# Patient Record
Sex: MALE | Race: WHITE | NOT HISPANIC OR LATINO | Employment: FULL TIME | URBAN - METROPOLITAN AREA
[De-identification: names, ages, dates, MRNs, and addresses within clinical notes are randomized per-mention and may not be internally consistent; named-entity substitution may affect disease eponyms.]

---

## 2018-12-12 ENCOUNTER — PRE VISIT (OUTPATIENT)
Dept: UROLOGY | Facility: CLINIC | Age: 33
End: 2018-12-12

## 2018-12-12 NOTE — TELEPHONE ENCOUNTER
MEDICAL RECORDS REQUEST   [facility]  Urology Clinic  [address] [place] [address]  PHONE: [phone]  Fax: [phone]        FUTURE VISIT INFORMATION                                                   Freddie Rodriguez, : 1985 scheduled for future visit at [facility]    APPOINTMENT INFORMATION:    Date: 2019    Provider:  LAMONT ROACH    Reason for Visit/Diagnosis: INFERTILITY    REFERRAL INFORMATION:    Referring provider:  SELF    Specialty: SELF    Referring providers clinic:  SELF    Clinic contact number:  SELF    RECORDS REQUESTED FOR VISIT                                                     NOTES  STATUS/DETAILS   OFFICE NOTE from referring provider  no   OFFICE NOTE from other specialist  no   DISCHARGE SUMMARY from hospital  no   DISCHARGE REPORT from the ER  no   OPERATIVE REPORT  no   MEDICATION LIST  no   INFERTILITY     ALBUMIN  in process   FSH  in process   LAST UROLOGY/OB GYN VISIT NOTE  in process   LH  in process   SEMEN ANALYSIS (LAST 2)  yes   SHBG  in process   T  in process       PRE-VISIT CHECKLIST      Record collection complete Yes     Appointment appropriately scheduled           (right time/right provider) Yes   MyChart activation If no, please explain IN PROCESS   Questionnaire complete If no, please explain IN PROCESS     Completed by: Charo Fitzgerald

## 2019-01-15 ENCOUNTER — TRANSFERRED RECORDS (OUTPATIENT)
Dept: HEALTH INFORMATION MANAGEMENT | Facility: CLINIC | Age: 34
End: 2019-01-15

## 2019-01-23 DIAGNOSIS — Z31.41 FERTILITY TESTING: Primary | ICD-10-CM

## 2019-01-30 ENCOUNTER — PRE VISIT (OUTPATIENT)
Dept: UROLOGY | Facility: CLINIC | Age: 34
End: 2019-01-30

## 2019-01-30 NOTE — TELEPHONE ENCOUNTER
Patient coming in for fertility consult with Dr. Monson. SA not in system. Left message for patient to call IVETTE to schedule SA.

## 2019-02-07 ENCOUNTER — OFFICE VISIT (OUTPATIENT)
Dept: UROLOGY | Facility: CLINIC | Age: 34
End: 2019-02-07
Payer: COMMERCIAL

## 2019-02-07 ENCOUNTER — APPOINTMENT (OUTPATIENT)
Dept: LAB | Facility: CLINIC | Age: 34
End: 2019-02-07
Payer: COMMERCIAL

## 2019-02-07 VITALS
DIASTOLIC BLOOD PRESSURE: 81 MMHG | HEART RATE: 58 BPM | HEIGHT: 72 IN | WEIGHT: 165 LBS | BODY MASS INDEX: 22.35 KG/M2 | SYSTOLIC BLOOD PRESSURE: 128 MMHG

## 2019-02-07 DIAGNOSIS — R86.8 TERATOSPERMIA: Primary | ICD-10-CM

## 2019-02-07 LAB — FSH SERPL-ACNC: 6.2 IU/L (ref 0.7–10.8)

## 2019-02-07 RX ORDER — ONDANSETRON 4 MG/1
TABLET, ORALLY DISINTEGRATING ORAL
Refills: 0 | COMMUNITY
Start: 2018-10-28

## 2019-02-07 ASSESSMENT — ENCOUNTER SYMPTOMS
RECTAL PAIN: 0
HEARTBURN: 1
CONSTIPATION: 0
VOMITING: 0
ABDOMINAL PAIN: 0
BLOOD IN STOOL: 0
BLOATING: 0
NAUSEA: 0
DIARRHEA: 0
BOWEL INCONTINENCE: 0
JAUNDICE: 0

## 2019-02-07 ASSESSMENT — PAIN SCALES - GENERAL: PAINLEVEL: NO PAIN (0)

## 2019-02-07 ASSESSMENT — MIFFLIN-ST. JEOR: SCORE: 1731.44

## 2019-02-07 NOTE — PROGRESS NOTES
It was my pleasure to see  Freddieshayy Hoyehuda, a 33 year old male here in consultation today for fertility evaluation.      HPI  Freddie Rodriguez is a 33 year old male with no significant PMH.  He and his partner have been attempting to conceive for the last 12 months. He has had one prior pregnancy with his current wife. They have tried timed intercours, including LH strips. They have not tried IUI or IVF.  They do not use lubrication during intercourse.  No associated conditions such as ED or sexual dysfunction.     The patient's spouse, Candace, is 34 years old.  She is in good health.  She has been pregnant once before.  She has regular monthly menstrual cycles.  She has been evaluated for infertility and has low progesterone, for which she is getting progesterone vaginal suppositories. She is scheduled to get an HSG shortly.    He did have a SA in 11/2018 which showed severe oligospermia. However this was a few weeks after he had pneumonia with high fever. Repeat SA in 1/2019 after he recovered from his illness had good counts.    PAST MEDICAL HISTORY:    Puberty normal   No associated conditions such as ED or sexual dysfunction.  No  problems.     PAST SURG HISTORY  No PSH    Medications as of 2/7/2019:  No meds    ALLERGY:  NKDA    SOCIAL HISTORY:  . 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  No alcohol abuse, no tobacco use.     FAMILY HISTORY: No inherited disorders. Origin from [place].     REVIEW OF SYSTEMS:  Denies erectile dysfunction, ejaculatory problems, testicular pain. No vision or smell deficits, no chronic sinus or respiratory infections. No recent febrile illness, weight loss. No heat or cold intolerance, gynecomastia, or other endocrine complaints.    Otherwise, no constitutional, eye, ENT, heart, lung, GI , musculoskeletal, skin, neurologic, psychiatric, or hematologic complaints.    GONADOTOXIN EXPOSURE: Unremarkable. Otherwise negative for marijuana, heat, chemicals, pesticides, heavy  metals, steroids, chemotherapy or radiation.    GENERAL PHYSICAL EXAM  /81   Pulse 58   Ht 1.829 m (6')   Wt 74.8 kg (165 lb)   BMI 22.38 kg/m     Constitutional: No acute distress. Well nourished.   PSYCH: normal mood and affect.  NEURO: normal gait, no focal deficits.   CARDIOPULMONARY: breathing non-labored, pulse regularrate, no peripheral edema.  GI: Abdomen soft, non-tender, no surgical scars, no organomegaly.  MUSCULOSKELETAL: normal limb proportions, no muscle wasting, no contractures.  SKIN: Normal virilized hair distribution, no lesions, warts or rashes over genitalia, abdomen extremities or face.  HEME/LYMPH: no ecchymosis, no lymphadenopathy in groin or neck, no lymphedema.     EXAM:  Phallus Circumcised, meatus adequate, no plaques palpated.   Left testis descended , size is 20 cc , consistency is nml. No intra-testicular masses.   Right testis descended , size is 20 cc , consistency is nml. No intra-testicular masses.   Epididymes present, non-tender, not enlarged.   Left cord: Vas present. no varicocele noted.  Right cord: Vas present. no varicocele noted.     Rectal exam deferred.     LABS:  Semen Analysis 11/2018:  (normal range in parenthesis)   -Volume: 5.1 ml (1.5-5.0)   -pH: 7.6 (>7.2)   -Concentration: 2.3 million/ml (>15 million/ml)   -% Forward progressive: 82% (>30%)   -Total progressive motile count: 4.2 million (>15.6 million)   -% Normal morphology: 0% (>4%)    Semen Analysis 01/2019:  (normal range in parenthesis)   -Volume: 4.6 ml (1.5-5.0)   -pH: 7.2 (>7.2)   -Concentration: 69.5 million/ml (>15 million/ml)   -% Forward progressive: 94% (>30%)   -Total progressive motile count: 189 million (>15.6 million)   -% Normal morphology: 2% (>4%)    ASSESSMENT:    Isolated SA with severe oligospermia shortly after a febrile illness. Repeat SA two months later is normal.      PLAN:    Hormonal panel including testosterone, estradiol, and FSH    Repeat SA  With CAP-score  testing.    Discussed OTC supplements to consider taking    Pt would be a good candidate for IUI with a female fertility specialist.    Dr. Monson will contact patient with results and plan/options    Patient was seen and examined with Dr. Ermias Tinajero MD  Urology Resident      I saw and examined the patient with the resident today.  I agree with the resident note and plan of care as above.     Neal Monson MD  Urology Staff

## 2019-02-07 NOTE — NURSING NOTE
Chief Complaint   Patient presents with     Consult      fertility consult        Manuela Garcia MA

## 2019-02-07 NOTE — LETTER
2/7/2019       RE: Freddie Rodriguez  [address] [place] [address]     Dear Colleague,    Thank you for referring your patient, Freddie Rodriguez, to the [facility] UROLOGY AND [facility]ST FOR PROST[facility] AND UROLOGIC CANCERS at [facility]. Please see a copy of my visit note below.    It was my pleasure to see Dr. Freddie Rodriguez, a 33 year old male here in consultation today for fertility evaluation.      HPI  Freddie Rodriguez is a 33 year old male with no significant PMH.  He and his partner have been attempting to conceive for the last 12 months. He has had one prior pregnancy with his current wife. They have tried timed intercours, including LH strips. They have not tried IUI or IVF.  They do not use lubrication during intercourse.  No associated conditions such as ED or sexual dysfunction.     The patient's spouse, Candace, is 34 years old.  She is in good health.  She has been pregnant once before.  She has regular monthly menstrual cycles.  She has been evaluated for infertility and has low progesterone, for which she is getting progesterone vaginal suppositories. She is scheduled to get an HSG shortly.    He did have a SA in 11/2018 which showed severe oligospermia. However this was a few weeks after he had pneumonia with high fever. Repeat SA in 1/2019 after he recovered from his illness had good counts.    PAST MEDICAL HISTORY:    Puberty normal   No associated conditions such as ED or sexual dysfunction.  No  problems.     PAST SURG HISTORY  No PSH    Medications as of 2/7/2019:  No meds    ALLERGY:  NKDA    SOCIAL HISTORY:  . 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  No alcohol abuse, no tobacco use.     FAMILY HISTORY: No inherited disorders. Origin from [place].     REVIEW OF SYSTEMS:  Denies erectile dysfunction, ejaculatory problems, testicular pain. No vision or smell deficits, no chronic sinus or respiratory infections. No recent febrile illness, weight loss. No heat or  cold intolerance, gynecomastia, or other endocrine complaints.    Otherwise, no constitutional, eye, ENT, heart, lung, GI , musculoskeletal, skin, neurologic, psychiatric, or hematologic complaints.    GONADOTOXIN EXPOSURE: Unremarkable. Otherwise negative for marijuana, heat, chemicals, pesticides, heavy metals, steroids, chemotherapy or radiation.    GENERAL PHYSICAL EXAM  /81   Pulse 58   Ht 1.829 m (6')   Wt 74.8 kg (165 lb)   BMI 22.38 kg/m      Constitutional: No acute distress. Well nourished.   PSYCH: normal mood and affect.  NEURO: normal gait, no focal deficits.   CARDIOPULMONARY: breathing non-labored, pulse regularrate, no peripheral edema.  GI: Abdomen soft, non-tender, no surgical scars, no organomegaly.  MUSCULOSKELETAL: normal limb proportions, no muscle wasting, no contractures.  SKIN: Normal virilized hair distribution, no lesions, warts or rashes over genitalia, abdomen extremities or face.  HEME/LYMPH: no ecchymosis, no lymphadenopathy in groin or neck, no lymphedema.     EXAM:  Phallus Circumcised, meatus adequate, no plaques palpated.   Left testis descended , size is 20 cc , consistency is nml. No intra-testicular masses.   Right testis descended , size is 20 cc , consistency is nml. No intra-testicular masses.   Epididymes present, non-tender, not enlarged.   Left cord: Vas present. no varicocele noted.  Right cord: Vas present. no varicocele noted.     Rectal exam deferred.     LABS:  Semen Analysis 11/2018:  (normal range in parenthesis)   -Volume: 5.1 ml (1.5-5.0)   -pH: 7.6 (>7.2)   -Concentration: 2.3 million/ml (>15 million/ml)   -% Forward progressive: 82% (>30%)   -Total progressive motile count: 4.2 million (>15.6 million)   -% Normal morphology: 0% (>4%)    Semen Analysis 01/2019:  (normal range in parenthesis)   -Volume: 4.6 ml (1.5-5.0)   -pH: 7.2 (>7.2)   -Concentration: 69.5 million/ml (>15 million/ml)   -% Forward progressive: 94% (>30%)   -Total progressive motile  count: 189 million (>15.6 million)   -% Normal morphology: 2% (>4%)    ASSESSMENT:    Isolated SA with severe oligospermia shortly after a febrile illness. Repeat SA two months later is normal.      PLAN:    Hormonal panel including testosterone, estradiol, and FSH    Repeat SA  With CAP-score testing.    Discussed OTC supplements to consider taking    Pt would be a good candidate for IUI with a female fertility specialist.    Dr. Monson will contact patient with results and plan/options    Patient was seen and examined with Dr. Ermias Tinajero MD  Urology Resident      I saw and examined the patient with the resident today.  I agree with the resident note and plan of care as above.     Neal Monson MD  Urology Staff

## 2019-02-11 LAB
SHBG SERPL-SCNC: 59 NMOL/L (ref 11–80)
TESTOST FREE SERPL-MCNC: 14.06 NG/DL (ref 4.7–24.4)
TESTOST SERPL-MCNC: 888 NG/DL (ref 240–950)

## 2019-02-12 LAB — ESTRADIOL SERPL HS-MCNC: 25 PG/ML (ref 10–40)

## 2019-02-13 NOTE — RESULT ENCOUNTER NOTE
Dear Freddie,   Here are your recent results.     Blood labs are all normal, no concerns.    Calculated free ( active) testosterone is 14 ng/dL ( >6.5 is preferred), so testosterone level looks great.   There is a normal testosterone to estrogen ratio.  FSH is the signal from the brain to the testicles to drive sperm production.  Your FSH level is normal, I prefer to see this under 7.5 or so.    Thank You  Let me know if you have any questions.    Rafaela RESENDIZ

## 2020-03-11 ENCOUNTER — HEALTH MAINTENANCE LETTER (OUTPATIENT)
Age: 35
End: 2020-03-11

## 2021-01-03 ENCOUNTER — HEALTH MAINTENANCE LETTER (OUTPATIENT)
Age: 36
End: 2021-01-03

## 2021-04-19 ENCOUNTER — TELEPHONE (OUTPATIENT)
Dept: UROLOGY | Facility: CLINIC | Age: 36
End: 2021-04-19

## 2021-04-19 NOTE — TELEPHONE ENCOUNTER
[facility] Call Center    Phone Message    May a detailed message be left on voicemail: yes     Reason for Call: Other: Pt calling to schedule vasectomy procedure. Pt previously saw Dr. Monson regarding infertility and consulted with him about vasectomy. Pt would like to move forward and schedule the procedure. Please call at  to schedule.     Action Taken: Message routed to:  [facility] ([facility] Urology    Travel Screening: Not Applicable

## 2021-04-21 NOTE — TELEPHONE ENCOUNTER
Called patient and left message regarding consult and vasectomy on Friday July 9th at 10:40am Lorenza Vallejo LPN Staff Nurse

## 2021-04-25 ENCOUNTER — HEALTH MAINTENANCE LETTER (OUTPATIENT)
Age: 36
End: 2021-04-25

## 2021-09-09 ENCOUNTER — PRE VISIT (OUTPATIENT)
Dept: UROLOGY | Facility: CLINIC | Age: 36
End: 2021-09-09

## 2021-09-09 NOTE — TELEPHONE ENCOUNTER
Reason for visit: Vasectomy        Relevant information: n/a     Records/imaging/labs/orders: in EPIC     Pt called: no; Will call patient if no response to [employer] message     At Rooming: updated consult first

## 2021-10-10 ENCOUNTER — HEALTH MAINTENANCE LETTER (OUTPATIENT)
Age: 36
End: 2021-10-10

## 2021-10-12 ENCOUNTER — PRE VISIT (OUTPATIENT)
Dept: UROLOGY | Facility: CLINIC | Age: 36
End: 2021-10-12

## 2021-10-12 NOTE — TELEPHONE ENCOUNTER
Reason for visit: Vasectomy        Relevant information: n/a     Records/imaging/labs/orders: in EPIC     Pt called: no; Will call patient if no response to [employer] message     At Rooming: normal vas

## 2021-10-28 ENCOUNTER — OFFICE VISIT (OUTPATIENT)
Dept: UROLOGY | Facility: CLINIC | Age: 36
End: 2021-10-28
Payer: COMMERCIAL

## 2021-10-28 VITALS
SYSTOLIC BLOOD PRESSURE: 130 MMHG | HEART RATE: 71 BPM | BODY MASS INDEX: 22.35 KG/M2 | HEIGHT: 72 IN | WEIGHT: 165 LBS | DIASTOLIC BLOOD PRESSURE: 84 MMHG

## 2021-10-28 DIAGNOSIS — Z30.2 ENCOUNTER FOR VASECTOMY: Primary | ICD-10-CM

## 2021-10-28 PROCEDURE — 55250 REMOVAL OF SPERM DUCT(S): CPT | Performed by: UROLOGY

## 2021-10-28 RX ORDER — LIDOCAINE HYDROCHLORIDE 20 MG/ML
100 INJECTION, SOLUTION EPIDURAL; INFILTRATION; INTRACAUDAL; PERINEURAL ONCE
Status: DISPENSED | OUTPATIENT
Start: 2021-10-28

## 2021-10-28 RX ORDER — FAMOTIDINE 20 MG/1
TABLET, FILM COATED ORAL
COMMUNITY
End: 2021-10-28

## 2021-10-28 RX ORDER — NAPROXEN 250 MG/1
TABLET ORAL
COMMUNITY
End: 2021-10-28

## 2021-10-28 ASSESSMENT — MIFFLIN-ST. JEOR: SCORE: 1716.44

## 2021-10-28 ASSESSMENT — PAIN SCALES - GENERAL: PAINLEVEL: NO PAIN (0)

## 2021-10-28 NOTE — LETTER
10/28/2021       RE: Freddie Rodriguez  [address]  [facility] [address]     Dear Colleague,    Thank you for referring your patient, Freddie Rodriguez, to the [facility]OLOGY [facility]IC [place] at [facility]. Please see a copy of my visit note below.    Procedure: Vasectomy bilateral.   Anesthesia: Local lidocaine injection by surgeon.  Surgeon: REMIGIO Monson M.D.   Assistant:  None    I discussed with him at length the risks and benefits of a vasectomy procedure. He understands that this is a sterilization procedure, and not reversible contraception. He understands that reversals, while possible, are not guaranteed to work and fairly complex. I discussed with him the option of sperm cryopreservation.     I stressed that he continues to be fertile in the post-operative period, and that he should continue using other contraceptive methods, such as a condom, until he obtains a semen analysis and we review the results to confirm success of the procedure and sterility. I also stressed to him that recanalization and pregnancy can occur in about 1 per thousand cases, possibly more even after we clear him with a semen analysis showing no motile sperm. I counseled him on the felisha-operative risks of bleeding, infection, short and long-term pain.  I described to him post-vasectomy pain syndrome that can occur in about 1 to 2% of men undergoing vasectomy.     We also discussed recovery times (typically days if no complications) and post-operative care including use of ice packs, pain medication and wound care.      Description of procedure: After the patient received education regarding vasectomy, including risks and benefits, we proceeded with obtaining consent and proceeded with the surgery. He understands that there is a risk of late failure from recanalization. He understands that he is fertile until he has completed one or more semen analyses and he is given clearance  from us for unprotected intercourse.  He understands that we will contact regarding the results but it is his responsibility to make sure he is cleared before having unprotected intercourse.  I have discussed with him other risks including bleeding, infection, acute and possible long-term pain.    The right vas was ligated first.  This was done using the standard technique by grasping it with a three-finger  and lifting it up to the skin.  A small amount of lidocaine was infiltrated into the skin and vasal sheath.  The skin was punctured and dilated bluntly using the scalpel-less dissector.  The sheath was identified and a rigid clamp was passed around the vas which was then lifted out of the incision.  The vas was cleaned off from the deferential vessels and the sheath, and cautery was used to divide the vas between mosquitos.  A small segment was removed and discarded.  The lumen of the vas was cannulated to confirm its identity, and the lumens of both ends were cauterized.  Pen cautery was used sparingly/as needed for minor bleeders.  A facial interposition was then performed with a single suture of 4-0 chromic. The skin defect was closed with a 4-0 chromic interrupted suture after confirming adequate hemostasis.       We then turned our attention to the left side and a similar technique was performed. This involved division, excision of a segment, cautery of the lumens, and fascial interposition.  There was some slight oozing from a dartos vein which was ligated with a simple 4-0 chromic suture, and two interrupted sutures were also placed on the skin to ensure hemostasis, which was excellent.    There were no hematomas noted at the end of the procedure.  The patient tolerated the procedure well.    He was advised to return for a post vasectomy semen check in 3 months, and that he is not sterile and must continue to use contraception until we tell him otherwise (based on follow-up semen  specimen(s)).    Plan:  -Post vasectomy semen analysis in 3 months   -ice packs to scrotum X 24h  -shower OK tomorrow  -over-the-counter analgesics recommended.      Rafaela RESENDIZ

## 2021-10-28 NOTE — PATIENT INSTRUCTIONS
What Can I Expect After My Vasectomy?      Your scrotum may be swollen and bruised. We suggest you:  - Raise the area and apply ice packs (or frozen vegetables). Use the ice packs for 20 minutes on and 20 minutes off for 24 to 36 hours.  - Wear a jock strap or tight briefs for support for a week.  - Limit your activity for the first 24 to 36 hours. Wait up to 48 hours, if you feel the need. No heavy lifting for 1 week.      You should be able to return to work the next day, unless you do heavy physical work.      You can safely ejaculate (have a sexual climax) in about one week, or when it feels comfortable.    Risk of pregnancy    After your vasectomy, you can still get your partner pregnant for three months or more. Use extra birth control, such as condoms, until tests show that you are sterile (no live sperm).    Vasectomy is not 100 percent reliable. Pregnancy may occur for about 1 out of 2000 men even after testing shows zero sperm count.    Can a vasectomy be reversed?    Vasectomy is meant to be birth control that lasts a lifetime. If you change your mind, we can try to reverse it with surgery. But this will not be successful in every case.    Sperm count test    You will have a sperm-count test after the vasectomy. You should have had at least 20 ejaculations (discharges of semen) since your surgery. It will be a few months before you are sterile. Ten to twelve weeks after your surgery, schedule a sperm count test. Call [phone] to schedule. We will call you in 2 weeks with the results.    If you have any questions, please contact us:    [facility]rology and [place] [facility] (nurse line): [phone]

## 2021-10-28 NOTE — NURSING NOTE
Chief Complaint   Patient presents with     Sterilization     Vasectomy       Height 1.829 m (6'), weight 74.8 kg (165 lb). Body mass index is 22.38 kg/m .    There is no problem list on file for this patient.      No Known Allergies    Current Outpatient Medications   Medication Sig Dispense Refill     famotidine (PEPCID) 20 MG tablet  (Patient not taking: Reported on 10/28/2021)       naproxen (NAPROSYN) 250 MG tablet  (Patient not taking: Reported on 10/28/2021)       ondansetron (ZOFRAN-ODT) 4 MG ODT tab DIS ONE T PO Q 4 H PRN NV  0       Social History     Tobacco Use     Smoking status: Never Smoker     Smokeless tobacco: Never Used   Substance Use Topics     Alcohol use: Yes     Drug use: No       Ezra Pearson EMT  10/28/2021  7:57 AM

## 2021-10-28 NOTE — PROGRESS NOTES
Procedure: Vasectomy bilateral.   Anesthesia: Local lidocaine injection by surgeon.  Surgeon: REMIGIO Monson M.D.   Assistant:  None    I discussed with him at length the risks and benefits of a vasectomy procedure. He understands that this is a sterilization procedure, and not reversible contraception. He understands that reversals, while possible, are not guaranteed to work and fairly complex. I discussed with him the option of sperm cryopreservation.     I stressed that he continues to be fertile in the post-operative period, and that he should continue using other contraceptive methods, such as a condom, until he obtains a semen analysis and we review the results to confirm success of the procedure and sterility. I also stressed to him that recanalization and pregnancy can occur in about 1 per thousand cases, possibly more even after we clear him with a semen analysis showing no motile sperm. I counseled him on the felisha-operative risks of bleeding, infection, short and long-term pain.  I described to him post-vasectomy pain syndrome that can occur in about 1 to 2% of men undergoing vasectomy.     We also discussed recovery times (typically days if no complications) and post-operative care including use of ice packs, pain medication and wound care.      Description of procedure: After the patient received education regarding vasectomy, including risks and benefits, we proceeded with obtaining consent and proceeded with the surgery. He understands that there is a risk of late failure from recanalization. He understands that he is fertile until he has completed one or more semen analyses and he is given clearance from us for unprotected intercourse.  He understands that we will contact regarding the results but it is his responsibility to make sure he is cleared before having unprotected intercourse.  I have discussed with him other risks including bleeding, infection, acute and possible long-term pain.    The right vas  was ligated first.  This was done using the standard technique by grasping it with a three-finger  and lifting it up to the skin.  A small amount of lidocaine was infiltrated into the skin and vasal sheath.  The skin was punctured and dilated bluntly using the scalpel-less dissector.  The sheath was identified and a rigid clamp was passed around the vas which was then lifted out of the incision.  The vas was cleaned off from the deferential vessels and the sheath, and cautery was used to divide the vas between mosquitos.  A small segment was removed and discarded.  The lumen of the vas was cannulated to confirm its identity, and the lumens of both ends were cauterized.  Pen cautery was used sparingly/as needed for minor bleeders.  A facial interposition was then performed with a single suture of 4-0 chromic. The skin defect was closed with a 4-0 chromic interrupted suture after confirming adequate hemostasis.       We then turned our attention to the left side and a similar technique was performed. This involved division, excision of a segment, cautery of the lumens, and fascial interposition.  There was some slight oozing from a dartos vein which was ligated with a simple 4-0 chromic suture, and two interrupted sutures were also placed on the skin to ensure hemostasis, which was excellent.    There were no hematomas noted at the end of the procedure.  The patient tolerated the procedure well.    He was advised to return for a post vasectomy semen check in 3 months, and that he is not sterile and must continue to use contraception until we tell him otherwise (based on follow-up semen specimen(s)).    Plan:  -Post vasectomy semen analysis in 3 months   -ice packs to scrotum X 24h  -shower OK tomorrow  -over-the-counter analgesics recommended.      Rafaela RESENDIZ

## 2022-02-22 ENCOUNTER — LAB (OUTPATIENT)
Dept: LAB | Facility: CLINIC | Age: 37
End: 2022-02-22
Attending: UROLOGY
Payer: COMMERCIAL

## 2022-02-22 DIAGNOSIS — Z30.2 ENCOUNTER FOR VASECTOMY: ICD-10-CM

## 2022-02-22 LAB
ABSTINENCE DAYS: 3 DAYS (ref 2–7)
AGGLUTINATION: NORMAL
ANALYSIS TEMP - CENTIGRADE: 22 CENTIGRADE
COLLECTION METHOD: NORMAL
COLLECTION SITE: NORMAL
CONSENT TO RELEASE TO PARTNER: NO
DAL- RECEIVED TIME: NORMAL
IMMOTILE: 0 %
LIQUEFIED: YES
NON-PROGRESSIVE MOTILITY: 0 %
PROGRESSIVE MOTILITY: 0 %
ROUND CELLS: 0 MILLION/ML
SPECIMEN PH: 7.6 PH
SPECIMEN VOLUME: 2.8 ML
SPERM CONCENTRATION: 0 MILLION/ML
TIME OF ANALYSIS: NORMAL
TOTAL PROGRESSIVE MOTILE NUMBER: 0 MILLION
TOTAL SPERM NUMBER: 0 MILLION
VISCOUS: NO

## 2022-02-22 PROCEDURE — 89321 SEMEN ANAL SPERM DETECTION: CPT

## 2022-02-23 NOTE — RESULT ENCOUNTER NOTE
Dear Freddie,     You are cleared for intercourse post-vasectomy.    Your semen analysis showed zero sperm.    Thank You!   Let me know if you have any questions.    Rafaela RESENDIZ

## 2022-05-21 ENCOUNTER — HEALTH MAINTENANCE LETTER (OUTPATIENT)
Age: 37
End: 2022-05-21

## 2022-09-18 ENCOUNTER — HEALTH MAINTENANCE LETTER (OUTPATIENT)
Age: 37
End: 2022-09-18

## 2023-06-04 ENCOUNTER — HEALTH MAINTENANCE LETTER (OUTPATIENT)
Age: 38
End: 2023-06-04

## 2024-07-14 ENCOUNTER — HEALTH MAINTENANCE LETTER (OUTPATIENT)
Age: 39
End: 2024-07-14

## (undated) RX ORDER — LIDOCAINE HYDROCHLORIDE 20 MG/ML
INJECTION, SOLUTION INFILTRATION; PERINEURAL
Status: DISPENSED
Start: 2021-10-28